# Patient Record
Sex: FEMALE | Race: WHITE | URBAN - METROPOLITAN AREA
[De-identification: names, ages, dates, MRNs, and addresses within clinical notes are randomized per-mention and may not be internally consistent; named-entity substitution may affect disease eponyms.]

---

## 2017-03-28 ENCOUNTER — OFFICE VISIT (OUTPATIENT)
Dept: ORTHOPEDIC SURGERY | Age: 57
End: 2017-03-28

## 2017-03-28 VITALS
SYSTOLIC BLOOD PRESSURE: 121 MMHG | HEART RATE: 75 BPM | WEIGHT: 165 LBS | HEIGHT: 65 IN | BODY MASS INDEX: 27.49 KG/M2 | DIASTOLIC BLOOD PRESSURE: 79 MMHG

## 2017-03-28 DIAGNOSIS — M75.02 ADHESIVE CAPSULITIS OF LEFT SHOULDER: Primary | ICD-10-CM

## 2017-03-28 DIAGNOSIS — M19.012 OSTEOARTHRITIS OF LEFT SHOULDER, UNSPECIFIED OSTEOARTHRITIS TYPE: ICD-10-CM

## 2017-03-28 DIAGNOSIS — M25.512 LEFT SHOULDER PAIN, UNSPECIFIED CHRONICITY: ICD-10-CM

## 2017-03-28 PROCEDURE — 73030 X-RAY EXAM OF SHOULDER: CPT | Performed by: ORTHOPAEDIC SURGERY

## 2017-03-28 PROCEDURE — 99243 OFF/OP CNSLTJ NEW/EST LOW 30: CPT | Performed by: ORTHOPAEDIC SURGERY

## 2017-03-28 PROCEDURE — 20610 DRAIN/INJ JOINT/BURSA W/O US: CPT | Performed by: ORTHOPAEDIC SURGERY

## 2017-03-28 RX ORDER — HYDROCODONE BITARTRATE AND ACETAMINOPHEN 10; 325 MG/1; MG/1
TABLET ORAL
Refills: 0 | COMMUNITY
Start: 2017-03-01

## 2017-03-28 RX ORDER — DULOXETIN HYDROCHLORIDE 30 MG/1
CAPSULE, DELAYED RELEASE ORAL
Refills: 0 | COMMUNITY
Start: 2017-03-21 | End: 2020-09-08

## 2017-03-28 RX ORDER — ETODOLAC 400 MG/1
TABLET, FILM COATED ORAL
Refills: 0 | COMMUNITY
Start: 2017-03-01 | End: 2020-09-08

## 2017-03-28 RX ORDER — METHOCARBAMOL 500 MG/1
TABLET, FILM COATED ORAL
Refills: 0 | COMMUNITY
Start: 2017-03-01 | End: 2020-09-08

## 2017-03-28 RX ORDER — OMEPRAZOLE 40 MG/1
CAPSULE, DELAYED RELEASE ORAL
Refills: 1 | COMMUNITY
Start: 2017-02-20 | End: 2020-09-08

## 2017-03-28 RX ORDER — LISINOPRIL AND HYDROCHLOROTHIAZIDE 12.5; 1 MG/1; MG/1
TABLET ORAL
Refills: 3 | COMMUNITY
Start: 2017-03-04 | End: 2020-09-17

## 2017-03-28 ASSESSMENT — ENCOUNTER SYMPTOMS
HEARTBURN: 1
SHORTNESS OF BREATH: 1

## 2020-09-08 ENCOUNTER — OFFICE VISIT (OUTPATIENT)
Dept: ORTHOPEDIC SURGERY | Age: 60
End: 2020-09-08
Payer: COMMERCIAL

## 2020-09-08 VITALS — WEIGHT: 140 LBS | HEIGHT: 65 IN | BODY MASS INDEX: 23.32 KG/M2

## 2020-09-08 PROCEDURE — 99204 OFFICE O/P NEW MOD 45 MIN: CPT | Performed by: ORTHOPAEDIC SURGERY

## 2020-09-08 RX ORDER — HYDROCHLOROTHIAZIDE 12.5 MG/1
CAPSULE, GELATIN COATED ORAL
COMMUNITY
Start: 2020-08-08

## 2020-09-08 RX ORDER — PREDNISONE 20 MG/1
TABLET ORAL
COMMUNITY
Start: 2020-08-10 | End: 2020-09-08

## 2020-09-08 RX ORDER — CYCLOBENZAPRINE HCL 10 MG
TABLET ORAL
COMMUNITY
Start: 2020-08-10 | End: 2021-01-12

## 2020-09-08 RX ORDER — AMLODIPINE BESYLATE 10 MG/1
TABLET ORAL
COMMUNITY
Start: 2020-07-02

## 2020-09-08 RX ORDER — ALBUTEROL SULFATE 90 UG/1
AEROSOL, METERED RESPIRATORY (INHALATION)
COMMUNITY
Start: 2020-08-03

## 2020-09-08 RX ORDER — NAPROXEN 500 MG/1
TABLET ORAL
COMMUNITY
Start: 2020-09-04

## 2020-09-08 RX ORDER — LIDOCAINE 50 MG/G
PATCH TOPICAL
COMMUNITY
Start: 2020-08-07

## 2020-09-08 NOTE — PROGRESS NOTES
12 [facility]  History and Physical  Shoulder Pain    Date:  2020    Name:  Tano Marinelli  Address:  [address] [place] [address]    :  1960      Age:   61 y.o.    SSN:  [id number]      Medical Record Number:  <[id number]>    Reason for Visit:    Shoulder Pain (OP/np right shoulder, f/u left shoulder)      HPI:   Tano Marinelli is a 61 y.o. female who presents to our office today complaining of  bilateral shoulder pain. The patient reports the right shoulder is more painful however she has had problems for the left shoulder for a much longer time. She has not been seen in our office since 2017. Since then the patient reports she was unable to be seen due to insurance reasons. She most recently her insurance has changed and she is now able to see Dr. Layla Camacho again. Patient reports that her pain management doctor, Dr. Lydia Atwood that has been managing her shoulder pain so far. She is currently taking Norco 10 mg up to 4 times a day. Patient reports that Dr. Lydia Atwood also did a cortisone injection in her right shoulder which has given her some relief and has decreased her pain levels by 50%. Overall the patient reports she continues to have pain with overhead movements in both her shoulders. She has trouble lifting or carrying. She also has sleep disturbances at nighttime when she is sleeping by both her shoulders. She denies any specific injuries to either shoulder. Of note the patient currently smokes 1 pack/day and has been doing this more or less for the past 46 years. She denies any numbness or tingling.     Pain Assessment  Location of Pain: Shoulder  Location Modifiers: Right, Left  Severity of Pain: 5  Quality of Pain: Aching, Dull, Sharp, Throbbing, Locking, Grinding, Popping, Cracking  Duration of Pain: Persistent  Frequency of Pain: Constant  Limiting Behavior: Yes  Result of Injury: No  Work-Related Injury: No  Are there other 1     No current facility-administered medications for this visit. Allergies: Allergies   Allergen Reactions    Augmentin [Amoxicillin-Pot Clavulanate] Anaphylaxis    Demerol Hcl [Meperidine] Shortness Of Breath    Keflex [Cephalexin] Shortness Of Breath    Motrin [Ibuprofen] Shortness Of Breath    Pcn [Penicillins] Hives    Prednisone Palpitations and Other (See Comments)     Made BP shoot up    Propoxyphene Anaphylaxis    Sulfa Antibiotics Rash       Physical Exam:  There were no vitals filed for this visit. General: Ernesto Ackerman is a healthy and well appearing 61 y.o. female who is sitting comfortably in our office in acute distress. General Exam:   Constitutional: Patient is adequately groomed with no evidence of malnutrition  DTRs: Deep tendon reflexes are intact  Mental Status: The patient is oriented to time, place and person. The patient's mood and affect are appropriate. Lymphatic: The lymphatic examination bilaterally reveals all areas to be without enlargement or induration. Vascular: Examination reveals no swelling or calf tenderness. Peripheral pulses are palpable and 2+. Neurological: The patient has good coordination. There is no weakness or sensory deficit. Neuro: alert. Oriented X 3  Eyes: Extra-ocular muscles intact  Mouth: Oral mucosa moist. No perioral lesions  Pulm: Respirations unlabored and regular. Right shoulder Exam:  Inspection:  No gross deformities, no signs of infection. Palpation: She has tenderness over the rotator cuff footprint. She has no tenderness of the posterior joint line or AC joint. She has mild tenderness the bicipital groove. Active Range of Motion: Forward elevation of 150, abduction of 90, external rotation with elbow at the side 40, internal rotation to the back is T12    Passive Range of Motion: Passively forward elevation can be further increased to 150.     Strength: +4/5 with external rotation with resistance with pain conservative management including activity modification and corticosteroid injections. We feel an MRI would be necessary to evaluate the rotator cuff for any tears. Once this is completed we will have her return back to our office for reevaluation. Ready to quit: Not Answered  Counseling given: Not Answered    She smokes, and and I discussed with the patient the risks of smoking on general health and also on bone and soft tissue healing (increased risk of complications, delayed healing, delayed union and non-union, poorer outcomes), and she agreed to cut down or stop smoking. She was agreeable to the above plan. All her questions were fully answered today. 9/8/2020  1:53 PM      Loreta Nicolas PA-C  Orthopaedic Sports Medicine Physician Assistant    During this examination, I, Loreta Nicolas PA-C, functioned as a scribe for Dr. Elza Johnson. This dictation was performed with a verbal recognition program (DRAGON) and it was checked for errors. It is possible that there are still dictated errors within this office note. If so, please bring any errors to my attention for an addendum. All efforts were made to ensure that this office note is accurate.  _________________________  I, Dr. Elza Johnson, personally performed the services described in this documentation as described by Loreta Nicolas PA-C in my presence, and it is both accurate and complete. Papa Gibson MD, PhD  9/8/2020

## 2020-09-11 ENCOUNTER — TELEPHONE (OUTPATIENT)
Dept: ORTHOPEDIC SURGERY | Age: 60
End: 2020-09-11

## 2020-09-11 NOTE — TELEPHONE ENCOUNTER
Spoke with [facility] CHRIS regarding concerns over which arm to do first.  She will discuss with Dr Sanjuana Lopez further when she come in for her appointment on 09/17/2020

## 2020-09-11 NOTE — TELEPHONE ENCOUNTER
Patient is calling and has concerns with her upcoming sx. States that she has loss feeling of right arm.  Ph [phone]

## 2020-09-14 ENCOUNTER — TELEPHONE (OUTPATIENT)
Dept: ORTHOPEDIC SURGERY | Age: 60
End: 2020-09-14

## 2020-09-17 ENCOUNTER — TELEPHONE (OUTPATIENT)
Dept: ORTHOPEDIC SURGERY | Age: 60
End: 2020-09-17

## 2020-09-17 ENCOUNTER — OFFICE VISIT (OUTPATIENT)
Dept: ORTHOPEDIC SURGERY | Age: 60
End: 2020-09-17
Payer: COMMERCIAL

## 2020-09-17 VITALS — WEIGHT: 140 LBS | HEIGHT: 65 IN | BODY MASS INDEX: 23.32 KG/M2

## 2020-09-17 PROCEDURE — L3670 SO ACRO/CLAV CAN WEB PRE OTS: HCPCS | Performed by: ORTHOPAEDIC SURGERY

## 2020-09-17 PROCEDURE — 99214 OFFICE O/P EST MOD 30 MIN: CPT | Performed by: ORTHOPAEDIC SURGERY

## 2020-09-17 NOTE — TELEPHONE ENCOUNTER
RECEIVED CALL FROM Payal Posada @   [facility], STATING THIS IS 2ND ATTEMPT:  PATIENT SCHEDULED FOR INPATIENT SHOULDER, LOOKING FOR AUTHORIZATION:  CALLBACK [phone].

## 2020-09-17 NOTE — TELEPHONE ENCOUNTER
Ary Gilbert [id number]    Date: 09/21/20  Type of SX:  INPATIENT  Location: [facility]  CPT: [id number]   DX Code: F89.017  SX area: [facility]  Insurance: Trice POLANCO   SX VALID 09/21/20 TO 9/23/20

## 2020-09-17 NOTE — PROGRESS NOTES
12 [facility]  History and Physical  Shoulder Pain    Date:  2020    Name:  Harpal Mckee  Address:  [address] [place] [address]    :  1960      Age:   61 y.o.    SSN:  [id number]      Medical Record Number:  <[id number]>    Reason for Visit:    Pre-op Exam (Left Shoulder - SX: 20)      HPI:   Harpal Mckee is a 61 y.o. female who presents to our office today for follow up of the left shoulder pain. She has advanced glenohumeral osteoarthritis and has failed all conservative management. She continues to have pain on a daily basis with any movement of the shoulder. She constantly hears popping and cracking within the shoulder. She was able to get an MRI completed for the left shoulder. She would like to review that today. She denies any new injury since her last visit. Pain Assessment  Location of Pain: Shoulder  Location Modifiers: Left  Severity of Pain: 6  Quality of Pain: Sharp  Duration of Pain: Persistent  Frequency of Pain: Constant  Aggravating Factors: (general use)  Limiting Behavior: Yes  Relieving Factors: Rest, Ice  Work-Related Injury: No  Are there other pain locations you wish to document?: No    Review of Systems    Patient has had no medical changes since last evaluated      Review of Systems:  A 14 point review of systems available in the scanned medical record as documented by the patient. The review is negative with the exception of those things mentioned in the History of Present Illness and Past Medical History. Past Medical History:  Patient's medications, allergies, past medical, surgical, social and family histories were reviewed and updated as appropriate. Allergies:   Allergies   Allergen Reactions    Augmentin [Amoxicillin-Pot Clavulanate] Anaphylaxis    Demerol Hcl [Meperidine] Shortness Of Breath    Keflex [Cephalexin] Shortness Of Breath    Motrin [Ibuprofen] Shortness Of Breath    Pcn were provided. They were instructed to contact the office immediately should the brace result in increased pain, decreased sensation, increased swelling or worsening of the condition. Plan: This patient has failed conservative management for osteoarthritis of the left shoulder and we feel that she is an excellent candidate for an anatomic total shoulder arthroplasty of the left shoulder. We did discuss the surgery in complete detail and the patient was agreeable to that. We were able to also fit her with an UltraSling brace today. Risks, benefits and potential complications of total shoulder arthroplasty surgery were discussed with the patient. Risks discussed include but are not limited to bleeding, infection, anesthetic risk, injury to nerves and blood vessels, deep vein thrombosis, residual stiffness and weakness, and the need for revision surgery. The patient also understands that anesthetic risks include cardiopulmonary issues, drug reactions and even death. The patient voices an understanding of the importance of physical therapy and home exercises after surgery. All questions were answered and written informed consent for surgery was obtained today. The patient was counseled at length about the risks of holland Covid-19 during their perioperative period and any recovery window from their procedure. The patient was made aware that holland Covid-19  may worsen their prognosis for recovering from their procedure  and lend to a higher morbidity and/or mortality risk. All material risks, benefits, and reasonable alternatives including postponing the procedure were discussed. The patient does wish to proceed with the procedure at this time.     She smokes, and and I discussed with the patient the risks of smoking on general health and also on bone and soft tissue healing (increased risk of complications, delayed healing, delayed union and non-union, poorer outcomes), and she agreed to cut down or stop smoking. We will see her back in our office postoperatively. 9/17/2020  2:34 PM      Monica Neal PA-C  Orthopaedic Sports Medicine Physician Assistant    During this examination, I, Monica Neal PA-C, functioned as a scribe for Dr. John Blake. This dictation was performed with a verbal recognition program (DRAGON) and it was checked for errors. It is possible that there are still dictated errors within this office note. If so, please bring any errors to my attention for an addendum. All efforts were made to ensure that this office note is accurate.  __________  I, Dr. John Blake, personally performed the services described in this documentation as described by Monica Neal PA-C in my presence, and it is both accurate and complete. Papa Harden MD, PhD  9/17/2020

## 2020-09-18 ENCOUNTER — TELEPHONE (OUTPATIENT)
Dept: ORTHOPEDIC SURGERY | Age: 60
End: 2020-09-18

## 2020-09-30 ENCOUNTER — TELEPHONE (OUTPATIENT)
Dept: ORTHOPEDIC SURGERY | Age: 60
End: 2020-09-30

## 2020-10-08 ENCOUNTER — TELEPHONE (OUTPATIENT)
Dept: ORTHOPEDIC SURGERY | Age: 60
End: 2020-10-08

## 2020-10-14 ENCOUNTER — TELEPHONE (OUTPATIENT)
Dept: ORTHOPEDIC SURGERY | Age: 60
End: 2020-10-14

## 2020-10-19 ENCOUNTER — TELEPHONE (OUTPATIENT)
Dept: ORTHOPEDIC SURGERY | Age: 60
End: 2020-10-19

## 2020-10-19 NOTE — TELEPHONE ENCOUNTER
Patient says she received a call from STACIE Diaz for her upcoming surgery, however she would like to have her surgery at [facility]. Patient was advised Dr. Hernandez Breeding  will reach out to her regarding this issue.

## 2020-10-21 ENCOUNTER — TELEPHONE (OUTPATIENT)
Dept: ORTHOPEDIC SURGERY | Age: 60
End: 2020-10-21

## 2020-10-21 NOTE — TELEPHONE ENCOUNTER
Other LOB[place]- [phone] FROM [facility]   QUESTIONS CONCERNING EKG AND BLOOD WORK  BEFORE SURGERY.

## 2020-10-22 ENCOUNTER — OFFICE VISIT (OUTPATIENT)
Dept: ORTHOPEDIC SURGERY | Age: 60
End: 2020-10-22
Payer: COMMERCIAL

## 2020-10-22 VITALS — HEIGHT: 65 IN | BODY MASS INDEX: 23.32 KG/M2 | WEIGHT: 140 LBS

## 2020-10-22 PROCEDURE — 99214 OFFICE O/P EST MOD 30 MIN: CPT | Performed by: ORTHOPAEDIC SURGERY

## 2020-10-22 RX ORDER — VERAPAMIL HYDROCHLORIDE 180 MG/1
CAPSULE, EXTENDED RELEASE ORAL
COMMUNITY
Start: 2020-10-06 | End: 2021-01-12

## 2020-10-22 NOTE — PROGRESS NOTES
Chief Complaint    Follow-up (right Shoulder)      History of Present Illness:  Conor Fuentes is a pleasant, 61 y.o., female, here today for follow up of her right shoulder. She has known rotator cuff dysfunction. At past visits we did explain to this patient she will likely be facing reverse total shoulder replacement, however she opted to get the left shoulder taken care of first as it is more symptomatic. Today she does complain of a \"dead arm\" and sporadic popping and burning. She does see Dr. Edith Bynum for pain management. She has previously undergone corticosteroid injections by Dr. Edith Bynum for this shoulder. This is very bothersome to her today. She reports no new injuries or setbacks. She does currently smoke, however she does say she has cut back to only 4 cigarettes per day. She is scheduled for a left total shoulder replacement on 11/2/20. Pain Assessment  Location of Pain: Shoulder  Location Modifiers: Right  Severity of Pain: 8  Quality of Pain: Aching, Dull, Sharp, Throbbing, Popping, Cracking  Duration of Pain: Persistent  Frequency of Pain: Constant  Limiting Behavior: Yes  Relieving Factors: Ice, Heat  Result of Injury: No  Work-Related Injury: No  Are there other pain locations you wish to document?: No      Medical History:  Patient's medications, allergies, past medical, surgical, social and family histories were reviewed and updated as appropriate. Patient has had no medical changes since last evaluated      Review of Systems  A 14 point review of systems was completed by the patient on 9/8/20 and is available in the media section of the scanned medical record and was reviewed on 10/22/2020. The review is negative with the exception of those things mentioned in the HPI and Past Medical History    Vital Signs:  Vitals:       General/Appearance: Alert and oriented and in no apparent distress. Skin:  There are no skin lesions, cellulitis, or extreme edema.  The patient has warm and well-perfused Bilateral upper extremities with brisk capillary refill. Right Shoulder Exam:  Inspection: No gross deformities, no signs of infection. Palpation: Glenohumeral and subacromial crepitation. Active Range of Motion: Unable to initiate forward elevation    Passive Range of Motion: Forward Elevation 90 with pain beyond shoulder height    Strength: Deferred    Special Tests: No Tha muscle deformity. Neurovascular: Sensation to light touch is intact, no motor deficits, palpable radial pulses 2+      Radiology:     Plain radiographs of the right shoulder comprising 3 views: AP, Outlet and Axillary were obtained and reviewed in the office    Impression: Joint is centered, Joint space narrowing of about 50%, No upward migration of the humeral head         Assessment :  Ms. Domonique Reyes is a pleasant, 61 y.o. patient with right shoulder pain. We do feel the source of her pain today is her cervical spine. She can keep her appointment for upcoming left total shoulder replacement. Impression:  Encounter Diagnoses   Name Primary?  Right shoulder pain, unspecified chronicity Yes    Rotator cuff dysfunction, right     Primary osteoarthritis of left shoulder        Office Procedures:  Orders Placed This Encounter   Procedures    XR SHOULDER RIGHT (MIN 2 VIEWS)     Standing Status:   Future     Number of Occurrences:   1     Standing Expiration Date:   10/22/2021     Order Specific Question:   Reason for exam:     Answer:   pain    XR CERVICAL SPINE (2-3 VIEWS)     Standing Status:   Future     Number of Occurrences:   1     Standing Expiration Date:   10/22/2021     Order Specific Question:   Reason for exam:     Answer:   pain       Treatment Plan:  We do feel the source of her pain is her cervical spine.  We will refer to Dr. Ruthy Rodríguez for possible cervical epidural. We will continue to plan for surgery on the left shoulder on 11/2/20, however she does need to get her pain under control on the right shoulder. We will see Catherine back for surgery and/or sooner as needed. All questions were answered to patient's satisfaction and She was encouraged to call with any further questions or concerns. Eulalia Montoya is in agreement with this plan. 10/22/2020  2:20 PM    Brent Kearney ATC  [facility] [address]    During this examination, I, Soheila Pate, functioned as a scribe for Dr. Jose Mccain. The history taking and physical examination were performed by Dr. Kala Fink. All counseling during the appointment was performed between the patient and Dr. Kala Fink. 10/22/20   __________________  I, Dr. Jose Mccain, personally performed the services described in this documentation as described by Brent Kearney ATC in my presence, and it is both accurate and complete. Papa Fink MD, PhD  10/22/2020

## 2020-10-23 ENCOUNTER — TELEPHONE (OUTPATIENT)
Dept: ORTHOPEDIC SURGERY | Age: 60
End: 2020-10-23

## 2020-10-23 NOTE — TELEPHONE ENCOUNTER
Eduardo Doctor [id number]    Date: 11/02/20  Type of SX:  INPATIENT  Location: [facility]  CPT: 81706  SX area: [facility]  Insurance: SINAI SMITH  PHONE MESSAGE FROM MIKA SMITH  SX VALID 11/02/20 TO 11/05/20

## 2020-10-27 ENCOUNTER — TELEPHONE (OUTPATIENT)
Dept: ORTHOPEDIC SURGERY | Age: 60
End: 2020-10-27

## 2020-10-29 ENCOUNTER — TELEPHONE (OUTPATIENT)
Dept: ORTHOPEDIC SURGERY | Age: 60
End: 2020-10-29

## 2020-10-30 ENCOUNTER — TELEPHONE (OUTPATIENT)
Dept: ORTHOPEDIC SURGERY | Age: 60
End: 2020-10-30

## 2020-10-30 NOTE — TELEPHONE ENCOUNTER
Medical Facility Question     Facility Name: [address] Name: Heena Bynum Number: [phone]  Request or Information: SHE HASN'T RECEIVED THE CLEARANCE YET AND HER SURGERY Beatrice Gear

## 2020-10-30 NOTE — TELEPHONE ENCOUNTER
Medical Facility Question     Facility Name: [facility]  Contact Name: Jelena Reddkavon Number: [phone]  Request or Information: SPEAK TO DR Colón Nine RE PATIENT blood in stool and blood tinged saliva

## 2020-11-02 ENCOUNTER — TELEPHONE (OUTPATIENT)
Dept: ORTHOPEDIC SURGERY | Age: 60
End: 2020-11-02

## 2020-11-03 ENCOUNTER — TELEPHONE (OUTPATIENT)
Dept: ORTHOPEDIC SURGERY | Age: 60
End: 2020-11-03

## 2020-11-03 RX ORDER — CLINDAMYCIN HYDROCHLORIDE 300 MG/1
300 CAPSULE ORAL 3 TIMES DAILY
Qty: 9 CAPSULE | Refills: 0 | Status: SHIPPED | OUTPATIENT
Start: 2020-11-03 | End: 2020-11-06

## 2020-11-03 RX ORDER — OXYCODONE HCL 10 MG/1
10 TABLET, FILM COATED, EXTENDED RELEASE ORAL EVERY 12 HOURS
Qty: 20 TABLET | Refills: 0 | Status: SHIPPED | OUTPATIENT
Start: 2020-11-03 | End: 2020-11-13

## 2020-11-03 NOTE — TELEPHONE ENCOUNTER
General Question     Subject: LEFT TOTAL SHOULDER REPLACEMENT  Patient [facility] [id number]    PATIENT IS HAVING SEVERE PAIN IN LEFT ARM AND IT HURTS FOR HER TO BREATHE. REQUESTING A CALL BACK AS SOON AS POSSIBLE.

## 2020-11-03 NOTE — TELEPHONE ENCOUNTER
Surgery and/or Procedure Scheduling     Contact Name: Gopal Cortes   Surgical/Procedure Request: Req antibiotics is needed.  DOS 11/2  Patient Contact Number: [phone]

## 2020-11-04 ENCOUNTER — TELEPHONE (OUTPATIENT)
Dept: ORTHOPEDIC SURGERY | Age: 60
End: 2020-11-04

## 2020-11-09 ENCOUNTER — TELEPHONE (OUTPATIENT)
Dept: ORTHOPEDIC SURGERY | Age: 60
End: 2020-11-09

## 2020-11-09 NOTE — TELEPHONE ENCOUNTER
Other PATIENT CALLBACK VERY UPSET, SPOKE W/ LEAD, PATIENT 7 [facility] A CALLBACK [phone].

## 2020-11-09 NOTE — TELEPHONE ENCOUNTER
Spoke with Alexsander regarding pre auth and arm. She is happy with 3pm appointment tomorrow. She did go ahead and get her pain med and antibiotic I told her I am waiting on additional papers to get pain meds approved so she can get her money back.

## 2020-11-10 ENCOUNTER — OFFICE VISIT (OUTPATIENT)
Dept: ORTHOPEDIC SURGERY | Age: 60
End: 2020-11-10

## 2020-11-10 VITALS — WEIGHT: 140 LBS | HEIGHT: 65 IN | BODY MASS INDEX: 23.32 KG/M2

## 2020-11-10 PROCEDURE — 99024 POSTOP FOLLOW-UP VISIT: CPT | Performed by: ORTHOPAEDIC SURGERY

## 2020-11-10 NOTE — LETTER
[address] [address]  Phone: [phone]  Fax: [phone]    Isac Barcenas MD        November 10, 2020     Patient: Flash Verma   YOB: 1960   Date of Visit: 11/10/2020       To Whom It May Concern: It is my medical opinion that Gurpreet Pickett underwent surgery on 11/02/20 her  was needed to care for her 11/02/20 and 11/03/20 he also was needed to bring her to post op appointment 11/10/20 . If you have any questions or concerns, please don't hesitate to call.     Sincerely,          Isac Barcenas MD

## 2020-11-10 NOTE — PROGRESS NOTES
History of Present Illness:  Flash Verma is a 61 y.o. female who presents for a post operative visit. The patient underwent a left anatomic total shoulder arthroplasty on 11/2/2020 by Dr. Florida Barrientos. She reports she is having pain from the surgical tape and that her pian improved once the tape was removed. She has been compliant with wearing the UltraSling brace at all times. The patient denies any fevers, chills, numbness, tingling, and shortness of breath. Medical History:  Patient's medications, allergies, past medical, surgical, social and family histories were reviewed and updated as appropriate. Review of Systems    Patient has had no medical changes since last evaluated      Review of Systems  A 14 point review of systems was completed by the patient is available in the media section of the scanned medical record and was reviewed on 11/10/2020. Vital Signs:  Vitals:       General/Appearance: Alert and oriented and in no apparent distress. Skin:  There are no skin lesions, cellulitis, or extreme edema. The patient has warm and well-perfused Bilateral upper extremities with brisk capillary refill. left Shoulder Exam:    Inspection: left anterior shoulder incision that is clean, dry and intact and well approximated. The Prineo dressing is still in place. Mild ecchymosis and swelling are present as can be expected. There is no erythema, drainage or other signs of infection    Palpation:  No crepitus to gentle motion    Active Range of Motion: Deferred    Passive Range of Motion:  Able to tolerate pendulum motions. Strength:  Deferred    Special Tests:  Deferred. Neurovascular: Sensation to light touch is intact, no motor deficits, palpable radial pulses 2+    Radiology:     Plain radiographs of the left shoulder comprising 2 views: AP in and out were obtained and reviewed in the office: Shows postsurgical changes from the left total shoulder replacement.   All the components are in good placement without any signs of loosening, fractures, subluxations or dislocations. Impression: Stable postop x-ray. Assessment :  Ms. Jennifer Baumann is a 61 y.o. patient is s/p a left anatomic total shoulder arthroplasty on 11/10/2020. Impression:  Encounter Diagnosis   Name Primary?  Status post total shoulder replacement, left Yes       Office Procedures:  Orders Placed This Encounter   Procedures    XR SHOULDER LEFT (MIN 2 VIEWS)     Standing Status:   Future     Number of Occurrences:   1     Standing Expiration Date:   11/10/2021     Order Specific Question:   Reason for exam:     Answer:   pain    Amb External Referral To Physical Therapy     Referral Priority:   Routine     Referral Type:   Consult for Advice and Opinion     Referral Reason:   Patient Preference     Requested Specialty:   Physical Therapy     Number of Visits Requested:   1       Treatment Plan:    Overall the patient is doing well. The pain is well-controlled. We recommend that she wear the UltraSling brace at all times with the exception of clothing, bathing of physical therapy. The patient was told that she is restricted from driving for at least 3 weeks postop. We will give a print out of their physical therapy order. She would like to do rehab at the [facility]. All of her questions were fully answered today. We would like to see her back in 2 weeks for follow-up visit. 4:11 PM      Giancarlo Moy PA-C  Orthopaedic Sports Medicine Physician Assistant    During this examination, Giancarlo SYKES PA-C, functioned as a scribe for Dr. Epifanio Grant. This dictation was performed with a verbal recognition program (DRAGON) and it was checked for errors. It is possible that there are still dictated errors within this office note. If so, please bring any errors to my attention for an addendum.   All efforts were made to ensure that this office note is accurate.  _________________  Dr. MONY Jamey Blanco, personally performed the services described in this documentation as described by Sheila Pinto PA-C in my presence, and it is both accurate and complete. Papa Liu MD, PhD  11/10/2020

## 2020-11-16 ENCOUNTER — TELEPHONE (OUTPATIENT)
Dept: ORTHOPEDIC SURGERY | Age: 60
End: 2020-11-16

## 2020-11-16 NOTE — TELEPHONE ENCOUNTER
Other PATIENT ASKING IF SHE COULD REMOVE SLING FROM ARM WHILE SITTING I CHAIR. CALLBACK [phone].

## 2020-11-20 ENCOUNTER — TELEPHONE (OUTPATIENT)
Dept: ORTHOPEDIC SURGERY | Age: 60
End: 2020-11-20

## 2020-11-20 NOTE — TELEPHONE ENCOUNTER
General Question     Subject:  She would like to know how long does she have to wear a sling after her sx    Patient and /or Facility Request: Patient    Contact Number: [phone]

## 2020-11-30 ENCOUNTER — TELEPHONE (OUTPATIENT)
Dept: ORTHOPEDIC SURGERY | Age: 60
End: 2020-11-30

## 2020-12-03 ENCOUNTER — OFFICE VISIT (OUTPATIENT)
Dept: ORTHOPEDIC SURGERY | Age: 60
End: 2020-12-03

## 2020-12-03 VITALS — BODY MASS INDEX: 23.32 KG/M2 | WEIGHT: 140 LBS | HEIGHT: 65 IN

## 2020-12-03 PROBLEM — R42 DIZZINESS: Status: ACTIVE | Noted: 2020-11-30

## 2020-12-03 PROBLEM — K92.1 BLACK TARRY STOOLS: Status: ACTIVE | Noted: 2020-11-30

## 2020-12-03 PROBLEM — R63.4 WEIGHT LOSS, UNINTENTIONAL: Status: ACTIVE | Noted: 2020-11-30

## 2020-12-03 PROBLEM — R19.7 DIARRHEA: Status: ACTIVE | Noted: 2020-11-30

## 2020-12-03 PROCEDURE — 99024 POSTOP FOLLOW-UP VISIT: CPT | Performed by: STUDENT IN AN ORGANIZED HEALTH CARE EDUCATION/TRAINING PROGRAM

## 2020-12-03 RX ORDER — OXYCODONE HYDROCHLORIDE AND ACETAMINOPHEN 5; 325 MG/1; MG/1
TABLET ORAL
COMMUNITY
Start: 2020-11-02 | End: 2021-03-18

## 2020-12-03 RX ORDER — ONDANSETRON 4 MG/1
TABLET, FILM COATED ORAL
COMMUNITY
Start: 2020-11-02

## 2020-12-03 NOTE — PROGRESS NOTES
12 [facility]  Office Visit  Follow up  Date:  12/3/2020    Name:  Xin Jonse  Address:  [address] [place] [address]    :  1960      Age:   61 y.o.    SSN:  [id number]      Medical Record Number:  <[id number]>    Chief Complaint:    Follow up for L shoulder    HPI:   Xin Jones is a 61 y.o. female here today for postoperative follow-up for her left shoulder. The patient underwent a left anatomic total shoulder arthroplasty on 2020. The patient has been doing okay in terms of the shoulder, however medically she is having some difficulties with blood in her stools. Due to concerns that she might potentially have Covid infection, she has not been allowed to go to inpatient physical therapy until the diagnosis is established. The patient may be in line for endoscopic evaluation. The bloody stools have been continuing with diarrhea for the past 3 weeks. She has been doing home exercises for her left shoulder 3 times a day diligently. Her pain is improving. She denies any numbness and tingling down the arm but does have some numbness around the incision. Prior HPI 11/10/2020: The patient underwent a left anatomic total shoulder arthroplasty on 2020 by Dr. Silvia Corona. She reports she is having pain from the surgical tape and that her pian improved once the tape was removed. She has been compliant with wearing the UltraSling brace at all times. The patient denies any fevers, chills, numbness, tingling, and shortness of breath. She denies any new numbness, tingling, fevers, chills, chest pain, shortness of breath, or any other new significant symptoms. Pain Assessment  Location of Pain: Shoulder  Location Modifiers: Left  Severity of Pain: 6  Quality of Pain: Sharp, Aching(burning)  Duration of Pain: Persistent  Frequency of Pain: Constant  Aggravating Factors:  Other (Comment)  Limiting Behavior: Yes  Relieving Factors: Ice, Exercise  Work-Related Injury: No  Are there other pain locations you wish to document?: No    Past History:  Past Medical History:   Diagnosis Date    Asthma     HTN (hypertension)     Osteoarthritis        Past Surgical History:   Procedure Laterality Date    HYSTERECTOMY, TOTAL ABDOMINAL      KNEE SURGERY      SHOULDER SURGERY Left 10/26/2020       Social History     Tobacco Use    Smoking status: Current Every Day Smoker    Smokeless tobacco: Never Used   Substance Use Topics    Alcohol use: Not on file    Drug use: Not on file        Family History:  family history is not on file.          Current Outpatient Medications:     ondansetron (ZOFRAN) 4 MG tablet, TK 1 T PO  Q 6 H PRN N, Disp: , Rfl:     oxyCODONE-acetaminophen (PERCOCET) 5-325 MG per tablet, TAKE ONE TABLET BY MOUTH EVERY 4 HOURS AS NEEDED FOR POSTOP PAIN, Disp: , Rfl:     verapamil (VERELAN) 180 MG extended release capsule, , Disp: , Rfl:     Tiotropium Bromide-Olodaterol 2.5-2.5 MCG/ACT AERS, Inhale 2 puffs into the lungs daily, Disp: , Rfl:     albuterol sulfate  (90 Base) MCG/ACT inhaler, INHALE 2 PUFFS BY MOUTH AS NEEDED, Disp: , Rfl:     amLODIPine (NORVASC) 10 MG tablet, TAKE 1 TAB BY MOUTH EVERY DAY, Disp: , Rfl:     cyclobenzaprine (FLEXERIL) 10 MG tablet, TAKE 1 TABLET BY MOUTH THREE TIMES A DAY AS NEEDED, Disp: , Rfl:     hydroCHLOROthiazide (MICROZIDE) 12.5 MG capsule, TAKE 1 CAPSULE BY MOUTH EVERY DAY, Disp: , Rfl:     lidocaine (LIDODERM) 5 %, APPLY 1 PATCH TO SKIN AND REMOVE AFTER 12 HOURS, Disp: , Rfl:     naproxen (NAPROSYN) 500 MG tablet, , Disp: , Rfl:     fluticasone propionate (FLOVENT) 100 MCG/BLIST AEPB inhaler, Inhale into the lungs, Disp: , Rfl:     HYDROcodone-acetaminophen (NORCO)  MG per tablet, TAKE AS DIRECTED BY MOUTH EVERY 5 HOURS, Disp: , Rfl: 0      Allergies   Allergen Reactions    Augmentin [Amoxicillin-Pot Clavulanate] Anaphylaxis    Cephalexin Shortness Of Breath and Hives shoulder pain, unspecified chronicity        Office Procedures:  No orders of the defined types were placed in this encounter. Plan:   Pertinent imaging was reviewed. The etiology, natural history, and treatment options for the disorder were discussed. The roles of activity modifications, medications, injections, physical therapy, and surgical interventions were all described to the patient and questions were answered. In regards to the patient's shoulder she can discontinue the sling and pillow at home. She should wear the sling when she is away from home. We will likely discontinue the sling entirely at 6 weeks. Updated letter for PT given to her for [facility]. E therapy. She needs to get her bloody stool problem addressed so she can get back into formal PT. Stressed the importance of the protocol to continue as best she can at home. Follow up in 2-3 weeks for 6 week follow up and XR L shoulder. Vamsi Garcia,   [facility] Clinical Fellow    The encounter with Nestor Ospina was supervised by Dr. Kala Fink who personally examined the patient and reviewed the plan. This dictation was performed with a verbal recognition program (DRAGON) and it was checked for errors. It is possible that there are still dictated errors within this office note. If so, please bring any errors to my attention for an addendum. All efforts were made to ensure that this office note is accurate.

## 2020-12-03 NOTE — LETTER
Shoulder Elbow Rehabilitation Referral    Patient Name: Eulalia Montoya      YOB: 1960    Diagnosis: Left anatomic TSA 11/2/2020  Precautions: NWB LUE, subscap protection  Date of Prescription: 12/3/2020    [x] Evaluate and Treat    Post Op Instructions:  [] Continuous passive motion (CPM)  [x] Elbow range of motion  [] Exercise in plane of scapula   []  Strengthening     [] Pulley and instruction    [] Home exercise program (copy to patient)   [] Sling when arm at risk  [] Sling or brace at all times   [x] AAROM: Forward elevation to  140            [x] AAROM: External rotation  To 40     [] Isometric external rotator strengthening [] AAROM: internal rotation: up the back  [x] Isometric abductor strengthening  [] AAROM: Internal abduction     [] Isometric internal rotator strengthening [] AAROM: cross-body adduction             Stretching:     Strengthening:  [] Four quadrant (FE, ER, IR, CBA)  [] Sleeper stretch    [] Rotator cuff (ER, IR, Abd)  [x] Forward Elevation    [] External Rotators     [] External Rotation    [] Internal Rotators  [] Internal Rotation: up/back   [] Abductors     [] Internal Rotation: supine in abduction  [] Flexors  [] Cross-body abduction    [] Extensors  [x] Pendulum (FE, Abd/Add, cw/ccw)  [] Scapular Stabilizers   [] Wall-walking (FE, Abd)    [] Shoulder shrugs     [] Table slides      [] Rhomboid pinch  [] Elbow (flex, ext, pron, sup)    [] Lat.  Pull downs     [] Medial epicondylitis program    [] Forward punch   [] Lateral epicondylitis program    [] Internal rotators     [] Progressive resistive exercises  [] Bench Press        [] Bench press plus  Activities:     [] Lateral pull-downs  [] Rowing     [] Progressive two-hand supine press  [] Stepper/Exercise bike   [] Biceps: curls/supination  [] Swimming  [] Water exercises    Modalities:     Return to Sport:  [x] Ultrasound     [] Plyometrics  [x] Iontophoresis     [] Rhythmic stabilization [x] Moist heat     [] Core strengthening   [x] Massage     [] Sports specific program:      [x] Cryotherapy      [x] Electrical stimulation     [x] Paraffin  [x] Whirlpool  [x] TENS    [] Home exercise program (copy to patient). Perform exercises for:   15     minutes    3      times/day  [] Supervised physical therapy  Frequency: []  1x week  [x] 2x week  [] 3x week  [] Other:   Duration: [] 2 weeks   [] 4 weeks  [x] 6 weeks  [] Other:     Additional Instructions:  Continue AAROM increasing by 10 degrees a week until 140/40. Continue subscap protection.        Martin Metz DO  [facility] Clinical Fellow

## 2020-12-08 ENCOUNTER — TELEPHONE (OUTPATIENT)
Dept: ORTHOPEDIC SURGERY | Age: 60
End: 2020-12-08

## 2021-01-12 ENCOUNTER — OFFICE VISIT (OUTPATIENT)
Dept: ORTHOPEDIC SURGERY | Age: 61
End: 2021-01-12

## 2021-01-12 VITALS — HEIGHT: 65 IN | BODY MASS INDEX: 23.32 KG/M2 | WEIGHT: 140 LBS

## 2021-01-12 DIAGNOSIS — Z96.612 STATUS POST TOTAL SHOULDER REPLACEMENT, LEFT: Primary | ICD-10-CM

## 2021-01-12 DIAGNOSIS — M19.012 PRIMARY OSTEOARTHRITIS OF LEFT SHOULDER: ICD-10-CM

## 2021-01-12 DIAGNOSIS — M25.511 RIGHT SHOULDER PAIN, UNSPECIFIED CHRONICITY: ICD-10-CM

## 2021-01-12 PROCEDURE — 99024 POSTOP FOLLOW-UP VISIT: CPT | Performed by: ORTHOPAEDIC SURGERY

## 2021-01-12 RX ORDER — CYCLOBENZAPRINE HCL 5 MG
TABLET ORAL
COMMUNITY
Start: 2021-01-06

## 2021-01-12 NOTE — PROGRESS NOTES
Chief Complaint    Shoulder Pain (F/U LEFT SHOUDLER)      History of Present Illness:  Young Whitlock is a pleasant, 61 y.o., female, here today for follow up of left shoulder anatomic total shoulder replacement. She has had a minor set back since the procedure from illness, but has recent;y recovered and is slowly improving. Since then, she reports no new injuries or setbacks. Pain Assessment  Location of Pain: Shoulder  Location Modifiers: Left  Severity of Pain: 10  Quality of Pain: Aching, Dull, Sharp, Throbbing  Duration of Pain: Persistent  Frequency of Pain: Constant  Limiting Behavior: Yes  Work-Related Injury: No  Are there other pain locations you wish to document?: No      Medical History:  Patient's medications, allergies, past medical, surgical, social and family histories were reviewed and updated as appropriate. Patient has had no medical changes since last evaluated        Review of Systems  A 14 point review of systems was completed by the patient and is available in the media section of the scanned medical record and was reviewed on 1/12/2021. The review is negative with the exception of those things mentioned in the HPI and Past Medical History    Vital Signs: There were no vitals filed for this visit. General/Appearance: Alert and oriented and in no apparent distress. Skin:  There are no skin lesions, cellulitis, or extreme edema. The patient has warm and well-perfused Bilateral upper extremities with brisk capillary refill. Left Shoulder Exam:  Inspection: No gross deformities, no signs of infection. Palpation: Reports more right shoulder discomfort vs. Left. Active Range of Motion:   Forward Elevation 100, Abduction 100, External Rotation 20, Internal Rotation PSIS    Passive Range of Motion: Deferred today    Strength:  External Rotation 4-/5, Internal Rotation 4-/5, Supraspinatus 4-/5, Champagne Toast 4-/5    Special Tests:  ( + ) [place] test.  No Tha muscle deformity. Neurovascular: Sensation to light touch is intact, no motor deficits, palpable radial pulses 2+      Radiology:     Plain radiographs of the left shoulder comprising 2 views: true AP and axillary were obtained and reviewed in the office: Shows postsurgical changes from the left total shoulder replacement. All the components are in good placement without any signs of loosening, fractures, subluxations or dislocations. Impression: Stable postop x-ray. Assessment :  Ms. Saida Orellana is a pleasant, 61 y.o. patient who is 10 weeks post anatomic TSA. She reports her left shoulder pain is better managed and actually is having more right shoulder pain. Although she was fighting some unrelated medical issues, she has not suffered any setbacks in therapy. Impression:  Encounter Diagnoses   Name Primary?  Status post total shoulder replacement, left Yes    Primary osteoarthritis of left shoulder     Right shoulder pain, unspecified chronicity        Office Procedures:  Orders Placed This Encounter   Procedures    XR SHOULDER LEFT (MIN 2 VIEWS)     Standing Status:   Future     Number of Occurrences:   1     Standing Expiration Date:   1/12/2022     Order Specific Question:   Reason for exam:     Answer:   F/U    External Referral To Physical Therapy     Referral Priority:   Routine     Referral Type:   Eval and Treat     Referral Reason:   Specialty Services Required     Requested Specialty:   Physical Therapy     Number of Visits Requested:   1       Treatment Plan: We recommend She continue in physical therapy at [facility] in [place], [place]. Vira Libman A new physical therapy letter was documented in [facility] today. We will see Catherine back in 5 weeks and/or as needed    We also discussed her right shoulder. She has a rotator cuff tear that will be addressed after her left shoulder heals. In addition, the patient was counseled with regard to the benefits of not smoking.  Specifically, the adverse consequences that tobacco use has musculoskeletal healing were addressed. The patient was offered referral for resources to assist with these efforts. She has managed to continue to decrease her cigarette usage and has agreed to continue to try quitting. All questions were answered to patient's satisfaction and She was encouraged to call with any further questions or concerns. John Corea is in agreement with this plan. Elizabeth Babb, am scribing for and in the presence of Dr. Erika Winston. The history taking and physical examination were performed by Dr. Antonio Roman. All counseling during the appointment was performed between the patient and Dr. Antonio Roman. 1/12/21 01/12/21  12:02 PM  _______________  I, Dr. Erika Winston, personally performed the services described in this documentation as described by Josué Deng AT in my presence, and it is both accurate and complete. Papa Roman MD, PhD  1/12/2021

## 2021-01-12 NOTE — LETTER
[] Iontophoresis    [] Rhythmic stabilization  [] Moist heat     [] Core strengthening   [] Massage     [] Sports specific program:     [x] Cryotherapy      [] Electrical stimulation     [] Paraffin  [] Whirlpool  [] TENS    [x] Home exercise program (copy to patient). [x] Supervised physical therapy  Frequency: [x]  1x week  [x] 2x week  [] 3x week  [] Other:   Duration: [] 2 weeks   [x] 4 weeks  [] 6 weeks  [] Other:       Manual stretching by therapist      Sincerely,      Papa Conte MD, PhD        1/12/2021       This dictation was performed with a verbal recognition program [facility]) and it was checked for errors. It is possible that there are still dictated errors within this office note. If so, please bring any errors to my attention for an addendum. All efforts were made to ensure that this office note is accurate.

## 2021-03-01 ENCOUNTER — TELEPHONE (OUTPATIENT)
Dept: ORTHOPEDIC SURGERY | Age: 61
End: 2021-03-01

## 2021-03-01 NOTE — TELEPHONE ENCOUNTER
General Question     Subject: Physical therapy questions, has some concerns with the therapist.   Patient and /or Facility Request: [facility] Number: [phone]

## 2021-03-18 ENCOUNTER — OFFICE VISIT (OUTPATIENT)
Dept: ORTHOPEDIC SURGERY | Age: 61
End: 2021-03-18
Payer: COMMERCIAL

## 2021-03-18 VITALS — BODY MASS INDEX: 23.32 KG/M2 | HEIGHT: 65 IN | WEIGHT: 140 LBS

## 2021-03-18 DIAGNOSIS — Z96.612 STATUS POST TOTAL SHOULDER REPLACEMENT, LEFT: Primary | ICD-10-CM

## 2021-03-18 DIAGNOSIS — G89.29 CHRONIC LEFT SHOULDER PAIN: ICD-10-CM

## 2021-03-18 DIAGNOSIS — M19.012 PRIMARY OSTEOARTHRITIS OF LEFT SHOULDER: ICD-10-CM

## 2021-03-18 DIAGNOSIS — M25.512 CHRONIC LEFT SHOULDER PAIN: ICD-10-CM

## 2021-03-18 PROCEDURE — 99213 OFFICE O/P EST LOW 20 MIN: CPT | Performed by: ORTHOPAEDIC SURGERY

## 2021-03-18 NOTE — PROGRESS NOTES
Chief Complaint    Shoulder Pain (F/U LEFT SHOULDER)      History of Present Illness:  Jackie Schilling is a pleasant, 61 y.o., female, here today for follow up of her left shoulder. The patient underwent a left anatomic total shoulder arthroplasty on 11/2/2020. She is now 4 months postop. She has continued in physical therapy at [facility]. She does have some popping with certain movement. She feels she is doing very well. She is working hard in therapy to regain motion. She is excited to continue working on strengthening. She reports no new injuries or setbacks. Pain Assessment  Location of Pain: Shoulder  Location Modifiers: Left  Severity of Pain: 0  Quality of Pain: Sharp  Duration of Pain: Persistent  Frequency of Pain: Intermittent  Aggravating Factors: Other (Comment)(GENERAL USE)  Limiting Behavior: Yes  Relieving Factors: Exercise, Rest  Result of Injury: No  Work-Related Injury: No  Are there other pain locations you wish to document?: No      Medical History:  Patient's medications, allergies, past medical, surgical, social and family histories were reviewed and updated as appropriate. Patient has had no medical changes since last evaluated        Review of Systems  A 14 point review of systems was completed by the patient on 9/8/20 and is available in the media section of the scanned medical record and was reviewed on 3/18/2021. The review is negative with the exception of those things mentioned in the HPI and Past Medical History    Vital Signs: There were no vitals filed for this visit. General/Appearance: Alert and oriented and in no apparent distress. Skin:  There are no skin lesions, cellulitis, or extreme edema. The patient has warm and well-perfused Bilateral upper extremities with brisk capillary refill. Left Shoulder Exam:  Inspection: Incision fully healed. No gross deformities, no signs of infection.     Palpation: Non-tender to light palpation    Active Range of Motion: Forward Elevation 150 with some end-range discomfort, Internal Rotation L4    Passive Range of Motion: Deferred    Strength:  External Rotation 4/5    Special Tests: Negative Lag, Positive [place], Positive Belly press, No Tha muscle deformity. Neurovascular: Sensation to light touch is intact, no motor deficits, palpable radial pulses 2+      Radiology:     No new XR obtained at this time. Assessment :  Ms. Larissa Klinefelter is a pleasant, 61 y.o. patient who is underwent a left anatomic total shoulder arthroplasty on 11/2/2020. She is now 4 months postop. She continues to smoke cigarettes and concerns remain over the integrity of her suscapularis repair. Impression:  Encounter Diagnoses   Name Primary?  Status post total shoulder replacement, left Yes    Primary osteoarthritis of left shoulder     Chronic left shoulder pain        Office Procedures:  Orders Placed This Encounter   Procedures    Amb External Referral To Physical Therapy     Referral Priority:   Routine     Referral Type:   Consult for Advice and Opinion     Referral Reason:   Patient Preference     Requested Specialty:   Physical Therapy     Number of Visits Requested:   1       Treatment Plan:  Larissa Klinefelter is doing well in her recovery, we do agree she will benefit from more physical therapy. We recommend She continue in physical therapy at [facility]. A new physical therapy letter was documented in [facility] today. We will have her continue to ramp up strengthening. We will see Catherine back in 4 weeks and/or as needed. All questions were answered to patient's satisfaction and She was encouraged to call with any further questions or concerns. Mauro Melchor is in agreement with this plan. 3/18/2021  12:23 PM    Diamante Maddox ATC  [employer] [address]    During this examination, I, Meri Lewis, functioned as a scribe for Dr. Mcihela Saenz.  The history taking and physical examination were performed by Dr. Jesse Hare. All counseling during the appointment was performed between the patient and Dr. Jesse Hare. 3/18/21   ______________  I, Dr. Lindy Tate, personally performed the services described in this documentation as described by Saira Raman ATC in my presence, and it is both accurate and complete. Papa Hare MD, PhD  3/18/2021

## 2021-03-18 NOTE — LETTER
Shoulder Elbow Rehabilitation Referral    Patient Name: Altagracia Neville      YOB: 1960    Diagnosis: s/p Left anatomic total shoulder replacement    Surgery Date: 11/2/20    Precautions:     Post Op Instructions:  [] Continuous passive motion (CPM)  [x] Elbow range of motion  [] Exercise in plane of scapula  []  Strengthening     [] Pulley and instruction   [x] Home exercise program (copy to patient)   [] Sling when arm at risk  [] Sling or brace at all times   [x] AROM: Forward elevation to  As harjinder           [x] AROM: External rotation  To  As harjinder    [x] Isometric external rotator strengthening [x] AROM: internal rotation: up the back  [x] Isometric abductor strengthening  [x] AROM: Internal abduction     [x] Isometric internal rotator strengthening [x] AROM: cross-body adduction             Stretching:     Strengthening:   [x] Four quadrant (FE, ER, IR, CBA)  [] Sleeper stretch    [x] Rotator cuff (ER, IR, Abd)  [] Forward Elevation    [x] External Rotators     [x] External Rotation    [x] Internal Rotators  [x] Internal Rotation: up/back   [] Abductors     [x] Internal Rotation: supine in abduction [] Flexors  [x] Cross-body abduction    [x] Extensors in neutral  [] Pendulum (FE, Abd/Add, cw/ccw)  [x] Scapular Stabilizers   [x] Wall-walking (FE, Abd)    [x] Shoulder shrugs     [x] Table slides      [x] Rhomboid pinch  [x] Elbow (flex, ext, pron, sup)    [] Lat.  Pull downs     [] Medial epicondylitis program   [] Forward punch   [] Lateral epicondylitis program   [] Internal rotators     [x] Progressive resistive exercises  [] Bench Press        [] Bench press plus  Activities:     [] Lateral pull-downs  [x] Rowing     [x] Progressive two-hand supine press  [] Stepper/Exercise bike   [x] Biceps: curls/supination and triceps  [] Swimming  [] Water exercises    Modalities: PRN    Return to Sport:  [] Ultrasound     [] Plyometrics  [] Iontophoresis    [] Rhythmic stabilization  [] Moist heat     [] Core strengthening   [] Massage     [] Sports specific program:     [x] Cryotherapy      [] Electrical stimulation     [] Paraffin  [] Whirlpool  [] TENS    [x] Home exercise program (copy to patient). [x] Supervised physical therapy  Frequency: [x]  1x week  [x] 2x week  [] 3x week  [] Other:   Duration: [] 2 weeks   [x] 4 weeks  [] 6 weeks  [] Other:       Manual stretching by therapist      Sincerely,      Papa Vides MD, PhD

## 2021-03-18 NOTE — PROGRESS NOTES
Oxygen Saturations:  Seated at rest - 93% on RA  Post amb with FWW - 82% on RA, recovered to 92% within 50 sec. No significant increase in SOB or fatigue, reminders given for coordinating breathing.    Discussed desat with RN, RN reporting she would keep an eye on it over the evening shift. Desats not affecting pt functionally during amb or stairs.    Patient has had no medical changes since last evaluated